# Patient Record
Sex: FEMALE | Race: WHITE | NOT HISPANIC OR LATINO | Employment: FULL TIME | URBAN - METROPOLITAN AREA
[De-identification: names, ages, dates, MRNs, and addresses within clinical notes are randomized per-mention and may not be internally consistent; named-entity substitution may affect disease eponyms.]

---

## 2018-09-11 ENCOUNTER — TELEPHONE (OUTPATIENT)
Dept: OBGYN CLINIC | Facility: HOSPITAL | Age: 53
End: 2018-09-11

## 2018-09-11 NOTE — TELEPHONE ENCOUNTER
Dr Omar Eaton    Patient called to be seen in the office  She is having trouble with her R foot pain and R shoulder pain  I explained that we see one issue at a time per appointment  She is asking if you would consider seeing her for both issues  She states that she has a brain tumor and has a lot of appointments and if she can would like to be seen for these at one visit due to many appointments    At the moment she is sched for Saturday morning for foot pain

## 2018-09-15 ENCOUNTER — OFFICE VISIT (OUTPATIENT)
Dept: OBGYN CLINIC | Facility: CLINIC | Age: 53
End: 2018-09-15
Payer: COMMERCIAL

## 2018-09-15 VITALS
BODY MASS INDEX: 36.32 KG/M2 | HEIGHT: 65 IN | HEART RATE: 77 BPM | WEIGHT: 218 LBS | DIASTOLIC BLOOD PRESSURE: 71 MMHG | SYSTOLIC BLOOD PRESSURE: 112 MMHG

## 2018-09-15 DIAGNOSIS — M25.511 RIGHT SHOULDER PAIN, UNSPECIFIED CHRONICITY: Primary | ICD-10-CM

## 2018-09-15 DIAGNOSIS — M79.671 RIGHT FOOT PAIN: ICD-10-CM

## 2018-09-15 PROCEDURE — 99214 OFFICE O/P EST MOD 30 MIN: CPT | Performed by: ORTHOPAEDIC SURGERY

## 2018-09-15 RX ORDER — UREA 10 %
800 LOTION (ML) TOPICAL DAILY
COMMUNITY

## 2018-09-15 RX ORDER — DEXAMETHASONE 2 MG/1
TABLET ORAL 2 TIMES DAILY
COMMUNITY
Start: 2018-04-09

## 2018-09-15 RX ORDER — LACOSAMIDE 100 MG/1
100 TABLET ORAL 2 TIMES DAILY
COMMUNITY
Start: 2018-07-27

## 2018-09-15 RX ORDER — LEVETIRACETAM 250 MG/1
TABLET ORAL
COMMUNITY
Start: 2018-04-30

## 2018-09-15 NOTE — PROGRESS NOTES
Assessment/Plan:  1  Right shoulder pain, unspecified chronicity  XR shoulder 2+ vw right   2  Right foot pain  XR foot 3+ vw right     Jeni Rivera has right-sided foot pain without clear fracture on x-ray  She likely suffered a contusion of the foot  She can continue with conservative treatment and ambulation as tolerated since she is improving  If the pain persists we could consider MRI in the future  She also has right-sided shoulder pain consistent with subacromial bursitis  I did discuss with her the possibility of physical therapy briefly  She did end up leaving the office early because she had a treatment appointment  We will discuss treatment options over the phone she can follow-up for cortisone injection repeat evaluation if needed  Subjective:   Kelvin Lozano is a 47 y o  female who presents for evaluation for 2 issues today  She has right-sided foot pain for the past several months  She states about 2 months ago she injured her right foot accidentally kicking something causing discomfort over the 4th and 5th digit in the right foot  She saw her primary care physician who ordered an x-ray which was read with no fracture  She has not have those images with her today  She has been ambulating over the last 2 months and continues to have significant discomfort in her right foot  She notices that she is limping slightly with this discomfort  She did have initial swelling and some bruising but this has improved  She states overall the pain is slightly improved but still persistent after 2 months  She denies any numbness tingling into her foot  She also has discomfort of her right shoulder  She describes an intermittent aching throbbing sensation over the anterior and lateral aspect of her shoulder  She states the pain worsens significantly at night  She denies any trauma or fall  She does have some discomfort with lifting objects or overhead movement    She denies any weakness or previous history of shoulder pain  She currently is under the care for a brain tumor and has been on Avastin IV treatments which she states gives her skeletal pain at times, but this pain seems to be different  She denies any radiating pain down her arm  She does have some discomfort into her right trapezius  Review of Systems   Constitutional: Negative for chills, fever and unexpected weight change  HENT: Negative for hearing loss, nosebleeds and sore throat  Eyes: Negative for pain, redness and visual disturbance  Respiratory: Negative for cough, shortness of breath and wheezing  Cardiovascular: Negative for chest pain, palpitations and leg swelling  Gastrointestinal: Negative for abdominal pain, nausea and vomiting  Endocrine: Negative for polydipsia and polyuria  Genitourinary: Negative for dysuria and hematuria  Musculoskeletal:        See HPI   Skin: Negative for rash and wound  Neurological: Negative for dizziness, numbness and headaches  Psychiatric/Behavioral: Negative for decreased concentration and suicidal ideas  The patient is not nervous/anxious  Past Medical History:   Diagnosis Date    Acid reflux     [facility] [address] [facility]     RBBB     Seizures [facility] [address] [facility]        Past Surgical History:   Procedure Laterality Date    ANKLE SURGERY Left  (unsure)    hardware    SECTION      CHOLECYSTECTOMY      CRANIECTOMY         History reviewed  No pertinent family history  Social History     Occupational History    Not on file       Social History Main Topics    Smoking status: Former Smoker    Smokeless tobacco: Never Used      Comment: former social smoker     Alcohol use No      Comment: former socail     Drug use: No    Sexual activity: Not on file         Current Outpatient Prescriptions:     bevacizumab (AVASTIN) 400 mg/16 mL, Infuse into a venous catheter every 14 (fourteen) days, Disp: , Rfl:     dexamethasone (DECADRON) 2 mg tablet, Take by mouth 2 (two) times a day, Disp: , Rfl:     esomeprazole (NexIUM) 40 MG capsule, Take 40 mg by mouth every morning before breakfast, Disp: , Rfl:     folic acid (FOLVITE) 491 MCG tablet, Take 800 mcg by mouth daily, Disp: , Rfl:     lacosamide (VIMPAT) 100 mg tablet, Take 100 mg by mouth 2 (two) times a day, Disp: , Rfl:     lamoTRIgine (LaMICtal) 100 mg tablet, Take 100 mg by mouth 2 (two) times a day, Disp: , Rfl:     levETIRAcetam (KEPPRA) 250 mg tablet, TAKE 1-250 MG TABLET WITH 1- 750 MG BY MOUTH IN THE MORNING FOR A TOTAL MORNING DOSAGE OF 1000 MG , Disp: , Rfl:     MAGNESIUM PO, Take by mouth daily, Disp: , Rfl:     Ped Multivitamins-Fl-Iron (MULTIVIT DROPS/FLUORIDE/IRON PO), Take by mouth daily, Disp: , Rfl:     saccharomyces boulardii (FLORASTOR) 250 mg capsule, Take by mouth daily, Disp: , Rfl:     Allergies   Allergen Reactions    Erythromycin Rash    Sulfa Antibiotics Rash       Objective:  Vitals:    09/15/18 0817   BP: 112/71   Pulse: 77       Right Shoulder Exam     Tenderness   The patient is experiencing tenderness in the biceps tendon (Subacromial bursa and right trapezius tenderness)  Range of Motion   The patient has normal right shoulder ROM  Muscle Strength   The patient has normal right shoulder strength  Abduction: 5/5   Internal Rotation: 5/5   External Rotation: 5/5   Supraspinatus: 5/5   Subscapularis: 5/5   Biceps: 5/5     Tests   Drop Arm: negative  Hawkin's test: positive  Impingement: positive    Other   Erythema: absent  Sensation: normal  Pulse: present            Physical Exam   Constitutional: She is oriented to person, place, and time  She appears well-developed and well-nourished  HENT:   Head: Normocephalic and atraumatic  Eyes: Conjunctivae are normal    Neck: Neck supple  Cardiovascular: Intact distal pulses  Pulmonary/Chest: Effort normal    Musculoskeletal:        Right foot: There is tenderness and bony tenderness          Feet:    Neurological: She is alert and oriented to person, place, and time  Skin: Skin is warm and dry  Psychiatric: She has a normal mood and affect  Her behavior is normal    Vitals reviewed  I have personally reviewed pertinent films in PACS and my interpretation is as follows: Three-view x-rays of the right foot demonstrate no evidence of acute fracture or abnormality  Three-view x-rays of the right shoulder demonstrate no evidence of acute fracture abnormality

## 2018-09-17 ENCOUNTER — APPOINTMENT (OUTPATIENT)
Dept: RADIOLOGY | Facility: CLINIC | Age: 53
End: 2018-09-17
Payer: COMMERCIAL

## 2018-09-17 DIAGNOSIS — M79.671 RIGHT FOOT PAIN: ICD-10-CM

## 2018-09-17 DIAGNOSIS — M25.511 RIGHT SHOULDER PAIN, UNSPECIFIED CHRONICITY: ICD-10-CM

## 2018-09-17 PROCEDURE — 73030 X-RAY EXAM OF SHOULDER: CPT

## 2018-09-17 PROCEDURE — 73630 X-RAY EXAM OF FOOT: CPT

## 2018-09-28 ENCOUNTER — OFFICE VISIT (OUTPATIENT)
Dept: OBGYN CLINIC | Facility: CLINIC | Age: 53
End: 2018-09-28
Payer: COMMERCIAL

## 2018-09-28 VITALS
WEIGHT: 215 LBS | SYSTOLIC BLOOD PRESSURE: 135 MMHG | HEART RATE: 85 BPM | DIASTOLIC BLOOD PRESSURE: 77 MMHG | HEIGHT: 65 IN | BODY MASS INDEX: 35.82 KG/M2

## 2018-09-28 DIAGNOSIS — M65.4 DE QUERVAIN'S TENOSYNOVITIS, LEFT: ICD-10-CM

## 2018-09-28 DIAGNOSIS — M75.51 SUBACROMIAL BURSITIS OF RIGHT SHOULDER JOINT: ICD-10-CM

## 2018-09-28 DIAGNOSIS — M79.671 PAIN IN RIGHT FOOT: Primary | ICD-10-CM

## 2018-09-28 PROCEDURE — 99214 OFFICE O/P EST MOD 30 MIN: CPT | Performed by: ORTHOPAEDIC SURGERY

## 2018-09-28 NOTE — PROGRESS NOTES
Assessment/Plan:  1  Pain in right foot  MRI foot/forefoot toest right wo contrast   2  De Quervain's tenosynovitis, left     3  Subacromial bursitis of right shoulder joint  Ambulatory referral to Physical Therapy       [place] has several issues today  She appears to have continued discomfort in her right foot which I am concern may be an underlying stress injury or hairline fracture in her 5th metatarsal   She does not have an obvious fracture on x-ray  I would like to proceed with an MRI of the right foot for further workup  We did place her in a CAM boot today to alleviate some of her discomfort with walking  Hopefully this will help her going forward  I will see her back in the office after the MRI is complete  She also appears to have right-sided shoulder pain consistent with subacromial bursitis and rotator cuff tendinitis with some impingement  I would like for her to begin physical therapy for this  We could consider cortisone injection in the future but I have asked for her to consult with her oncologist to see if a cortisone injection is recommended or prohibited in her care  She also has left-sided thumb discomfort consistent with de Quervain tenosynovitis  We did place her in a thumb spica splint in the office today  Hopefully this will reduce some of her discomfort  We could consider cortisone injection or occupational therapy in the future  I also gave her exercises to do at home for this  Subjective:   Robinson Baires is a 47 y o  female who presents for follow-up for several injuries today  I last saw her 2 weeks ago for evaluation for her right shoulder where she had symptoms concerning for rotator cuff tendinitis/subacromial bursitis  She had an x-ray at that time which did not show a significant abnormality  She continues to have right-sided shoulder pain with difficulty raising her arm at this time   We briefly discussed possibility of physical therapy versus cortisone injection at last office visit  She is about to start physical therapy as prescribed by another physician for core strengthening, she is wondering if she can add the shoulder therapy to this  She is also experiencing right-sided foot pain concerning for possible fracture that has been bothering her for 2 months  She had pinpoint tenderness in her foot and had an x-ray at her last office visit which did not show definitive fracture  She states that she continues to have significant discomfort with ambulation as well  She also reports swelling over lateral aspect of her right foot  She is currently being treated for a brain tumor as well as a leukemia  She is on multiple medical treatments and the brain tumor does cause a resting tremor and gait abnormality  She also has left-sided thumb pain that has been bothering her for several weeks  She reports clicking and pain over the dorsum of her left thumb  The pain is intermittent and sharp and stabbing over the thumb  It worsens with lifting objects or movement the hand and improves with rest   She has been wrapping the thumb with an over-the-counter brace which does not completely eliminate her pain  Review of Systems   Constitutional: Negative for chills, fever and unexpected weight change  HENT: Negative for hearing loss, nosebleeds and sore throat  Eyes: Negative for pain, redness and visual disturbance  Respiratory: Negative for cough, shortness of breath and wheezing  Cardiovascular: Negative for chest pain, palpitations and leg swelling  Gastrointestinal: Negative for abdominal pain, nausea and vomiting  Endocrine: Negative for polydipsia and polyuria  Genitourinary: Negative for dysuria and hematuria  Musculoskeletal:        See HPI   Skin: Negative for rash and wound  Neurological: Negative for dizziness, numbness and headaches  Psychiatric/Behavioral: Negative for decreased concentration and suicidal ideas   The patient is not nervous/anxious  Past Medical History:   Diagnosis Date    Acid reflux     [facility]) [facility] [address]     RBBB     Seizures ([facility] [address] [facility]        Past Surgical History:   Procedure Laterality Date    ANKLE SURGERY Left  (unsure)    hardware    SECTION      CHOLECYSTECTOMY      CRANIECTOMY         History reviewed  No pertinent family history  Social History     Occupational History    Not on file       Social History Main Topics    Smoking status: Former Smoker    Smokeless tobacco: Never Used      Comment: former social smoker     Alcohol use No      Comment: former socail     Drug use: No    Sexual activity: Not on file         Current Outpatient Prescriptions:     bevacizumab (AVASTIN) 400 mg/16 mL, Infuse into a venous catheter every 14 (fourteen) days, Disp: , Rfl:     dexamethasone (DECADRON) 2 mg tablet, Take by mouth 2 (two) times a day, Disp: , Rfl:     esomeprazole (NexIUM) 40 MG capsule, Take 40 mg by mouth every morning before breakfast, Disp: , Rfl:     folic acid (FOLVITE) 027 MCG tablet, Take 800 mcg by mouth daily, Disp: , Rfl:     lacosamide (VIMPAT) 100 mg tablet, Take 100 mg by mouth 2 (two) times a day, Disp: , Rfl:     lamoTRIgine (LaMICtal) 100 mg tablet, Take 100 mg by mouth 2 (two) times a day, Disp: , Rfl:     levETIRAcetam (KEPPRA) 250 mg tablet, TAKE 1-250 MG TABLET WITH 1- 750 MG BY MOUTH IN THE MORNING FOR A TOTAL MORNING DOSAGE OF 1000 MG , Disp: , Rfl:     MAGNESIUM PO, Take by mouth daily, Disp: , Rfl:     Ped Multivitamins-Fl-Iron (MULTIVIT DROPS/FLUORIDE/IRON PO), Take by mouth daily, Disp: , Rfl:     saccharomyces boulardii (FLORASTOR) 250 mg capsule, Take by mouth daily, Disp: , Rfl:     Allergies   Allergen Reactions    Erythromycin Rash    Sulfa Antibiotics Rash       Objective:  Vitals:    18 0927   BP: 135/77   Pulse: 85       Left Hand Exam     Tenderness   The patient is experiencing tenderness in the snuff box and radial area  Range of Motion     Wrist   Extension: normal   Flexion: normal   Pronation: normal   Supination: normal     Tests   Finkelstein: positive    Other   Erythema: absent  Sensation: normal  Pulse: present      Right Shoulder Exam     Tenderness   Right shoulder tenderness location: Subacromial bursa  Range of Motion   Active Abduction: 150   Passive Abduction:  160 abnormal   Extension: normal   Forward Flexion:  150 abnormal   External Rotation: normal     Muscle Strength   Abduction: 4/5   Internal Rotation: 5/5   External Rotation: 5/5   Supraspinatus: 4/5   Subscapularis: 5/5   Biceps: 5/5     Tests   Drop Arm: negative  Hawkin's test: positive  Impingement: positive    Other   Sensation: normal  Pulse: present          Tests     Left Wrist/Hand   Positive Finkelstein's  Physical Exam   Constitutional: She is oriented to person, place, and time  She appears well-developed and well-nourished  HENT:   Head: Normocephalic and atraumatic  Eyes: Conjunctivae are normal    Neck: Neck supple  Cardiovascular: Intact distal pulses  Pulmonary/Chest: Effort normal    Musculoskeletal:        Right foot: There is tenderness, bony tenderness and swelling  Feet:    Neurological: She is alert and oriented to person, place, and time  Skin: Skin is warm and dry  Psychiatric: She has a normal mood and affect  Her behavior is normal    Vitals reviewed  I have personally reviewed pertinent films in PACS and my interpretation is as follows: Three-view x-rays of the right shoulder demonstrate no evidence of acute fracture abnormality  Three-view x-rays of the right foot demonstrate no evidence of acute fracture

## 2018-10-17 ENCOUNTER — TELEPHONE (OUTPATIENT)
Dept: OBGYN CLINIC | Facility: CLINIC | Age: 53
End: 2018-10-17

## 2018-10-17 NOTE — TELEPHONE ENCOUNTER
I checked again, NO PRIOR AUTH through [facility]  I also contact [facility] and spoke with Mariajose in their precert dept  , who verified while we were on the phone together that Cate REYES 2

## 2018-10-17 NOTE — TELEPHONE ENCOUNTER
[facility] called in regards to the patients MRI of right foot w/o contrast  MRI is requiring prior Auth through [facility]  They stated needs to be ASAP  She comes in for the MRI on 10/20/2018   It will be done at their [place] location     [address] [place] [address] tax I D : [phone]

## 2018-10-23 ENCOUNTER — OFFICE VISIT (OUTPATIENT)
Dept: OBGYN CLINIC | Facility: CLINIC | Age: 53
End: 2018-10-23
Payer: COMMERCIAL

## 2018-10-23 VITALS
BODY MASS INDEX: 35.82 KG/M2 | WEIGHT: 215 LBS | HEART RATE: 77 BPM | SYSTOLIC BLOOD PRESSURE: 108 MMHG | HEIGHT: 65 IN | DIASTOLIC BLOOD PRESSURE: 69 MMHG

## 2018-10-23 DIAGNOSIS — M84.374D STRESS REACTION OF RIGHT FOOT WITH ROUTINE HEALING, SUBSEQUENT ENCOUNTER: Primary | ICD-10-CM

## 2018-10-23 PROCEDURE — 99213 OFFICE O/P EST LOW 20 MIN: CPT | Performed by: ORTHOPAEDIC SURGERY

## 2018-10-23 RX ORDER — LAMOTRIGINE 200 MG/1
TABLET, EXTENDED RELEASE ORAL
COMMUNITY
Start: 2018-09-14

## 2018-10-23 RX ORDER — LEVETIRACETAM 750 MG/1
TABLET ORAL
Refills: 1 | COMMUNITY
Start: 2018-07-26

## 2018-10-23 RX ORDER — LACOSAMIDE 50 MG
50 TABLET ORAL 2 TIMES DAILY
Refills: 1 | COMMUNITY
Start: 2018-09-19

## 2018-10-23 RX ORDER — PREDNISONE 10 MG/1
TABLET ORAL
Refills: 0 | COMMUNITY
Start: 2018-10-10

## 2018-10-23 RX ORDER — LORAZEPAM 0.5 MG/1
TABLET ORAL
Refills: 0 | COMMUNITY
Start: 2018-10-16

## 2018-10-23 NOTE — PROGRESS NOTES
Assessment/Plan:  1  Stress reaction of right foot with routine healing, subsequent encounter       Marimar Billingsley has continued pain in her right foot but improving status on physical exam   Her MRI does state there is a stress reaction in the distal 5th metatarsal without evidence of stress fracture  I do think she is improving with the Cam boot we will continue mobilization with the boot for now  I would like for her to continue in the boot for the next 2-3 weeks  I will see her back in the office for repeat evaluation  If she is less tender we can transition her had the boot at that time  Subjective:   Vadim Gay is a 47 y o  female who presents for follow-up for right-sided foot pain  She had discomfort over the distal 5th metatarsal in the office 1 month ago  We did place her in a CAM walker boot  She returns today stating that the pain is improving but she still has some discomfort over the distal aspect of the 5th metatarsal   She did obtain an MRI which was ordered 4 weeks ago recently this week  She returns for those results today  She did not bring the disc for review  Review of Systems   Constitutional: Negative for chills, fever and unexpected weight change  HENT: Negative for hearing loss, nosebleeds and sore throat  Eyes: Negative for pain, redness and visual disturbance  Respiratory: Negative for cough, shortness of breath and wheezing  Cardiovascular: Negative for chest pain, palpitations and leg swelling  Gastrointestinal: Negative for abdominal pain, nausea and vomiting  Endocrine: Negative for polydipsia and polyuria  Genitourinary: Negative for dysuria and hematuria  Musculoskeletal:        See HPI   Skin: Negative for rash and wound  Neurological: Positive for numbness  Negative for dizziness and headaches  Psychiatric/Behavioral: Negative for decreased concentration and suicidal ideas  The patient is nervous/anxious            Past Medical History: Diagnosis Date    Acid reflux     [facility] [address]     RBBB     Seizures ([facility] [address]        Past Surgical History:   Procedure Laterality Date    ANKLE SURGERY Left  (unsure)    hardware    SECTION      CHOLECYSTECTOMY      CRANIECTOMY         History reviewed  No pertinent family history  Social History     Occupational History    Not on file       Social History Main Topics    Smoking status: Former Smoker    Smokeless tobacco: Never Used      Comment: former social smoker     Alcohol use No      Comment: former socail     Drug use: No    Sexual activity: Not on file         Current Outpatient Prescriptions:     bevacizumab (AVASTIN) 400 mg/16 mL, Infuse into a venous catheter every 14 (fourteen) days, Disp: , Rfl:     dexamethasone (DECADRON) 2 mg tablet, Take by mouth 2 (two) times a day, Disp: , Rfl:     esomeprazole (NexIUM) 40 MG capsule, Take 40 mg by mouth every morning before breakfast, Disp: , Rfl:     folic acid (FOLVITE) 869 MCG tablet, Take 800 mcg by mouth daily, Disp: , Rfl:     lacosamide (VIMPAT) 100 mg tablet, Take 100 mg by mouth 2 (two) times a day, Disp: , Rfl:     lamoTRIgine (LaMICtal) 100 mg tablet, Take 100 mg by mouth 2 (two) times a day, Disp: , Rfl:     LamoTRIgine  MG TB24, , Disp: , Rfl:     levETIRAcetam (KEPPRA) 250 mg tablet, TAKE 1-250 MG TABLET WITH 1- 750 MG BY MOUTH IN THE MORNING FOR A TOTAL MORNING DOSAGE OF 1000 MG , Disp: , Rfl:     levETIRAcetam (KEPPRA) 750 mg tablet, TAKE 1 TABLET BY MOUTH IN THE MORNING (AND IN ADDITION TAKE 1000 MG IN THE EVENING ), Disp: , Rfl: 1    LORazepam (ATIVAN) 0 5 mg tablet, TAKE 1 TABLET BY MOUTH EVERY 24 HOURS FOR SLEEP, Disp: , Rfl: 0    MAGNESIUM PO, Take by mouth daily, Disp: , Rfl:     Ped Multivitamins-Fl-Iron (MULTIVIT DROPS/FLUORIDE/IRON PO), Take by mouth daily, Disp: , Rfl:     predniSONE 10 mg tablet, TAKE 8 TABLET BY ORAL ROUTE EVERY DAY, Disp: , Rfl: 0    saccharomyces jacintoi (FLORASTOR) 250 mg capsule, Take by mouth daily, Disp: , Rfl:     VIMPAT 50 MG, Take 50 mg by mouth 2 (two) times a day, Disp: , Rfl: 1    Allergies   Allergen Reactions    Erythromycin Rash    Sulfa Antibiotics Rash       Objective:  Vitals:    10/23/18 1547   BP: 108/69   Pulse: 77       Ortho Exam    Physical Exam   Constitutional: She is oriented to person, place, and time  She appears well-developed and well-nourished  HENT:   Head: Normocephalic and atraumatic  Eyes: Conjunctivae are normal    Neck: Neck supple  Cardiovascular: Intact distal pulses  Pulmonary/Chest: Effort normal    Musculoskeletal:        Right foot: There is tenderness and bony tenderness  Feet:    Neurological: She is alert and oriented to person, place, and time  Skin: Skin is warm and dry  Psychiatric: She has a normal mood and affect  Her behavior is normal    Vitals reviewed  No visible MRI in the office today  Patient's MRI report states there is a stress reaction in the distal 5th

## 2018-11-13 ENCOUNTER — OFFICE VISIT (OUTPATIENT)
Dept: OBGYN CLINIC | Facility: CLINIC | Age: 53
End: 2018-11-13
Payer: COMMERCIAL

## 2018-11-13 VITALS
HEART RATE: 87 BPM | WEIGHT: 213 LBS | HEIGHT: 65 IN | BODY MASS INDEX: 35.49 KG/M2 | SYSTOLIC BLOOD PRESSURE: 121 MMHG | DIASTOLIC BLOOD PRESSURE: 71 MMHG

## 2018-11-13 DIAGNOSIS — M84.374D STRESS REACTION OF RIGHT FOOT WITH ROUTINE HEALING, SUBSEQUENT ENCOUNTER: Primary | ICD-10-CM

## 2018-11-13 PROCEDURE — 99213 OFFICE O/P EST LOW 20 MIN: CPT | Performed by: ORTHOPAEDIC SURGERY

## 2018-11-13 RX ORDER — PREDNISONE 10 MG/1
TABLET ORAL
COMMUNITY
Start: 2018-10-10

## 2018-11-13 RX ORDER — LACOSAMIDE 200 MG/1
200 TABLET, FILM COATED ORAL EVERY 12 HOURS
Refills: 0 | COMMUNITY
Start: 2018-10-29

## 2018-11-13 RX ORDER — LORAZEPAM 1 MG/1
1 TABLET ORAL DAILY
COMMUNITY
Start: 2017-11-30

## 2018-11-13 NOTE — PROGRESS NOTES
Assessment/Plan:  1  Stress reaction of right foot with routine healing, subsequent encounter         Albino Lee is doing well and has no pain on examination today  She can come out of the boot and ambulate as tolerated  She can follow up with me as needed  Subjective:   Anahy You is a 46 y o  female who presents for follow-up for right foot 5th metatarsal stress reaction  She states that she is feeling great today and no pain  She has been in a CAM boot for the past few weeks and seems to be doing fine  Her MRI showed a stress reaction without fracture 3 weeks ago  Review of Systems   Constitutional: Negative for chills, fever and unexpected weight change  HENT: Negative for hearing loss, nosebleeds and sore throat  Eyes: Negative for pain, redness and visual disturbance  Respiratory: Negative for cough, shortness of breath and wheezing  Cardiovascular: Negative for chest pain, palpitations and leg swelling  Gastrointestinal: Negative for abdominal pain, nausea and vomiting  Endocrine: Negative for polydipsia and polyuria  Genitourinary: Negative for dysuria and hematuria  Musculoskeletal:        See HPI   Skin: Negative for rash and wound  Neurological: Negative for dizziness, numbness and headaches  Psychiatric/Behavioral: Negative for decreased concentration and suicidal ideas  The patient is not nervous/anxious  Past Medical History:   Diagnosis Date    Acid reflux     [facility] tumor [facility]benign) [facility] [address] [facility]     RBBB     Seizures ([facility] [address] )        Past Surgical History:   Procedure Laterality Date    ANKLE SURGERY Left  (unsure)    hardware    SECTION      CHOLECYSTECTOMY      CRANIECTOMY         No family history on file  Social History     Occupational History    Not on file       Social History Main Topics    Smoking status: Former Smoker    Smokeless tobacco: Never Used      Comment: former social smoker     Alcohol use No      Comment: former socail  Drug use: No    Sexual activity: Not on file         Current Outpatient Prescriptions:     bevacizumab (AVASTIN) 400 mg/16 mL, Infuse into a venous catheter every 14 (fourteen) days, Disp: , Rfl:     dexamethasone (DECADRON) 2 mg tablet, Take by mouth 2 (two) times a day, Disp: , Rfl:     esomeprazole (NexIUM) 40 MG capsule, Take 40 mg by mouth every morning before breakfast, Disp: , Rfl:     folic acid (FOLVITE) 873 MCG tablet, Take 800 mcg by mouth daily, Disp: , Rfl:     lacosamide (VIMPAT) 100 mg tablet, Take 100 mg by mouth 2 (two) times a day, Disp: , Rfl:     lamoTRIgine (LaMICtal) 100 mg tablet, Take 100 mg by mouth 2 (two) times a day, Disp: , Rfl:     LamoTRIgine  MG TB24, , Disp: , Rfl:     levETIRAcetam (KEPPRA) 250 mg tablet, TAKE 1-250 MG TABLET WITH 1- 750 MG BY MOUTH IN THE MORNING FOR A TOTAL MORNING DOSAGE OF 1000 MG , Disp: , Rfl:     LORazepam (ATIVAN) 0 5 mg tablet, TAKE 1 TABLET BY MOUTH EVERY 24 HOURS FOR SLEEP, Disp: , Rfl: 0    LORazepam (ATIVAN) 1 mg tablet, Take 1 mg by mouth daily, Disp: , Rfl:     MAGNESIUM PO, Take by mouth daily, Disp: , Rfl:     methotrexate 2 5 mg tablet, Take 10 mg by mouth every 7 days, Disp: , Rfl:     Ped Multivitamins-Fl-Iron (MULTIVIT DROPS/FLUORIDE/IRON PO), Take by mouth daily, Disp: , Rfl:     predniSONE 10 mg tablet, TAKE 8 TABLET BY ORAL ROUTE EVERY DAY, Disp: , Rfl: 0    saccharomyces boulardii (FLORASTOR) 250 mg capsule, Take by mouth daily, Disp: , Rfl:     VIMPAT 200 MG tablet, Take 200 mg by mouth every 12 (twelve) hours, Disp: , Rfl: 0    VIMPAT 50 MG, Take 50 mg by mouth 2 (two) times a day, Disp: , Rfl: 1    levETIRAcetam (KEPPRA) 750 mg tablet, TAKE 1 TABLET BY MOUTH IN THE MORNING (AND IN ADDITION TAKE 1000 MG IN THE EVENING ), Disp: , Rfl: 1    predniSONE 10 mg tablet, 7 tabs daily, Disp: , Rfl:     Allergies   Allergen Reactions    Erythromycin Rash    Sulfa Antibiotics Rash       Objective:  Vitals:    11/13/18 1716   BP: 121/71   Pulse: 87       Ortho Exam    Physical Exam   Constitutional: She is oriented to person, place, and time  She appears well-developed and well-nourished  HENT:   Head: Normocephalic and atraumatic  Eyes: Conjunctivae are normal    Neck: Neck supple  Cardiovascular: Intact distal pulses  Pulmonary/Chest: Effort normal    Musculoskeletal:        Right foot: There is no tenderness, no bony tenderness and no swelling  Neurological: She is alert and oriented to person, place, and time  Skin: Skin is warm and dry  Psychiatric: She has a normal mood and affect  Her behavior is normal    Vitals reviewed

## 2022-03-19 PROBLEM — G95.20 CERVICAL CORD COMPRESSION WITH MYELOPATHY (HCC): Status: ACTIVE | Noted: 2022-03-08

## 2022-03-19 PROBLEM — M54.12 CERVICAL RADICULOPATHY: Status: ACTIVE | Noted: 2022-03-08

## 2022-06-22 ENCOUNTER — TELEPHONE (OUTPATIENT)
Dept: NEUROSURGERY | Age: 57
End: 2022-06-22

## 2022-06-28 ENCOUNTER — CLINICAL DOCUMENTATION (OUTPATIENT)
Dept: NEUROSURGERY | Age: 57
End: 2022-06-28

## 2022-06-28 NOTE — PROGRESS NOTES
Dr. Sarthak Flores patient doesnt wish to follow up at this time. If a 2nd opinion is warranted she will need to get a referral from her PCP. Just sheila.